# Patient Record
(demographics unavailable — no encounter records)

---

## 2025-04-10 NOTE — ASSESSMENT
[FreeTextEntry1] : 17-year-old female with left ankle sprain.  I have placed her in a tall cam walker boot weightbearing as tolerated however she states it is too painful and therefore I will also provide her with crutches.  She will use over-the-counter medication and follow-up in 3 weeks for reassessment.  Patient is aware if there is any issues she can contact the office and she verbalized understanding and agreement.

## 2025-04-10 NOTE — IMAGING
[de-identified] : L ankle:  + tenderness to palpation over ATFL/PTFL, no tenderness over lateral malleolus, - Garzon's test, decreased ROM with dorsiflexion and plantar flexion, NV intact.  X-ray L ankle 3 views: no fractures or bony abnormalities noted.    L foot: + tenderness over dorsum of foot, NV intact, no edema or ecchymosis noted.  X-ray L foot 3 views:  no fractures or bony abnormalities noted.

## 2025-04-10 NOTE — HISTORY OF PRESENT ILLNESS
[de-identified] : Hakeem is a 17 year old female who presents to the walk in for evaluation of L foot and ankle pain s/p fall that occurred earlier today.  She states she slipped coming down the stairs and twisted her L foot/ankle.  She notes swelling and pain on the lateral aspect of the ankle and top of her foot.  She states the pain occurs when she weight bears and she has limited ROM due to pain.  She has not taken any medication for pain.